# Patient Record
Sex: MALE | Race: WHITE | NOT HISPANIC OR LATINO | Employment: STUDENT | URBAN - METROPOLITAN AREA
[De-identification: names, ages, dates, MRNs, and addresses within clinical notes are randomized per-mention and may not be internally consistent; named-entity substitution may affect disease eponyms.]

---

## 2019-03-28 ENCOUNTER — OFFICE VISIT (OUTPATIENT)
Dept: SURGERY | Facility: CLINIC | Age: 24
End: 2019-03-28
Payer: COMMERCIAL

## 2019-03-28 VITALS
WEIGHT: 215 LBS | HEART RATE: 58 BPM | DIASTOLIC BLOOD PRESSURE: 68 MMHG | SYSTOLIC BLOOD PRESSURE: 122 MMHG | HEIGHT: 76 IN | OXYGEN SATURATION: 98 % | RESPIRATION RATE: 16 BRPM | BODY MASS INDEX: 26.18 KG/M2

## 2019-03-28 DIAGNOSIS — K40.20 NON-RECURRENT BILATERAL INGUINAL HERNIA WITHOUT OBSTRUCTION OR GANGRENE: Primary | ICD-10-CM

## 2019-03-28 PROCEDURE — 99204 OFFICE O/P NEW MOD 45 MIN: CPT | Performed by: SURGERY

## 2019-03-28 SDOH — HEALTH STABILITY: MENTAL HEALTH: HOW OFTEN DO YOU HAVE A DRINK CONTAINING ALCOHOL?: NEVER

## 2019-03-28 ASSESSMENT — MIFFLIN-ST. JEOR: SCORE: 2076.73

## 2019-03-28 NOTE — NURSING NOTE
Pain Location: epigastric    Pain type: sharp    Bulge: No    Bulge reducible: No    Symptoms: None    Time Frame of Hernia: 3-4 month(s) ago

## 2019-03-28 NOTE — PROGRESS NOTES
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  He reports that he began to experience lower abdominal pain without any identifiable injury of December of this past year.  This at its worse was perhaps an 8 or 9 out of 10 and gave him thoughts that he may have to miss the play a football game he was participating in.  He describes having sharp pain above his pubic bone with some radiation into his inguinal areas.  He took some time off and noticed no real specific improvement.  He has had pain with coughing and sneezing.  He is also had pain with abdominal exercise/crunches and sit ups.  He did initiate a rehab and physical therapy regiment and has noted some improvement in his symptoms.  He reports that his symptoms are now at worst a 3 or 4 out of 10.  That said he has been also avoiding any type of activities that previously would have been causing him pain.  His bowel function has been normal.  He has had no problems with constipation or diarrhea.    PMH:   has no past medical history on file.  PSH:    has no past surgical history on file.  Social History:   reports that  has never smoked. he has never used smokeless tobacco. He reports that he does not drink alcohol or use drugs.  Family History:  family history includes Breast Cancer in his paternal grandmother; Hyperlipidemia in his father; Hypertension in his father.  Medications/Allergies: Home medications and allergies reviewed.    ROS:  The 10 point Review of Systems is negative other than noted in the HPI.    Physical Exam:  /68 (BP Location: Right arm, Cuff Size: Adult Regular)   Pulse 58   Resp 16   Ht 1.93 m (6' 4\")   Wt 97.5 kg (215 lb)   SpO2 98%   BMI 26.17 kg/m    GENERAL: Generally appears " well.  Psych: Alert and Oriented.  Normal affect  Eyes: Sclera clear  Respiratory:  Lungs clear to ausculation bilaterally with good air excursion  Cardiovascular:  Regular Rate and Rhythm with no murmurs gallops or rubs, normal peripheral pulses  GI: Abdomen Non Distended Non-Tender  No hernias palpated..  Groin- I examined the patient in both the standing and supine positions. Right Groin- Small inguinal hernia. Left Groin- Small inguinal hernia. No scrotal or testicle abnormalities.  Is also noted on exam to have pain and around the pubic bone with straight leg raise as well as some degree with resisted sit up.  Lymphatic/Hematologic/Immune:  No femoral or cervical lymphadenopathy.  Integumentary:  No rashes  Neurological: grossly intact     All new lab and imaging data was reviewed.     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  I think he be a reasonable candidate for laparoscopic repair.  That said he has noted some improvement in symptoms and I would defer to him as to whether or not he would like to proceed at this time.  I discussed the pathophysiology of hernias and options for repair including laparoscopic VS open.  The risks associated with the procedure including, but not limited to, recurrence, nerve entrapment or injury, persistence of pain, injury to the bowel/bladder, infertility, hematoma, mesh migration, mesh infection, MI, and PE were discussed with the patient. He indicated understanding of the discussion, asked appropriate questions, and provided consent. Signs and symptoms of incarceration were discussed. If these develop in the interim, he promises to call or go straight to the ER. I have provided the patient with an information pamphlet.    Thank you very much for this consult.    Silas Paniagua M.D.  [facility]  Consultants  [phone]    Please route or send letter to:  Primary Care Provider (PCP) and Referring Provider